# Patient Record
Sex: FEMALE | Race: BLACK OR AFRICAN AMERICAN | NOT HISPANIC OR LATINO
[De-identification: names, ages, dates, MRNs, and addresses within clinical notes are randomized per-mention and may not be internally consistent; named-entity substitution may affect disease eponyms.]

---

## 2022-06-10 ENCOUNTER — APPOINTMENT (OUTPATIENT)
Dept: FAMILY MEDICINE | Facility: CLINIC | Age: 57
End: 2022-06-10
Payer: COMMERCIAL

## 2022-06-10 VITALS
TEMPERATURE: 98 F | SYSTOLIC BLOOD PRESSURE: 130 MMHG | HEIGHT: 62 IN | OXYGEN SATURATION: 98 % | DIASTOLIC BLOOD PRESSURE: 80 MMHG | HEART RATE: 71 BPM | WEIGHT: 156 LBS | BODY MASS INDEX: 28.71 KG/M2

## 2022-06-10 DIAGNOSIS — Z80.0 FAMILY HISTORY OF MALIGNANT NEOPLASM OF DIGESTIVE ORGANS: ICD-10-CM

## 2022-06-10 DIAGNOSIS — M25.569 PAIN IN UNSPECIFIED KNEE: ICD-10-CM

## 2022-06-10 DIAGNOSIS — Z80.3 FAMILY HISTORY OF MALIGNANT NEOPLASM OF BREAST: ICD-10-CM

## 2022-06-10 DIAGNOSIS — M25.579 PAIN IN UNSPECIFIED ANKLE AND JOINTS OF UNSPECIFIED FOOT: ICD-10-CM

## 2022-06-10 DIAGNOSIS — Z11.3 ENCOUNTER FOR SCREENING FOR INFECTIONS WITH A PREDOMINANTLY SEXUAL MODE OF TRANSMISSION: ICD-10-CM

## 2022-06-10 PROCEDURE — 99386 PREV VISIT NEW AGE 40-64: CPT | Mod: 25

## 2022-06-10 PROCEDURE — 36415 COLL VENOUS BLD VENIPUNCTURE: CPT

## 2022-06-10 NOTE — HISTORY OF PRESENT ILLNESS
[FreeTextEntry1] : Ms. RAMY DIAZ presents for annual physical.\par  [de-identified] : Ms. RAMY DIAZ presents for annual physical.  Borderline cholesterol.  \par \par Blacked out in , did not pass out-went to , was advised to see ENT-manuever performed and Cardiology work up was negative.\par \par Following up with Cardio today. Declined EKG today because she has visit with Cardio today. \par Left Knee pain and right ankle pain.  Saw Orthopedist-in 2022.  Had X-rays done, needs to get MRI done.  Did not start PT\par \par Gynecology-2 years ago pap smear normal. \par [id number]-Zplom-needs \par [id number] this year.  Needs repeat. \par \par Family Hx: Mom- during childbirth\par Grandma-Stomach or breast cancer\par Older Sister-breast cancer, stomach cancer\par  \par \par COVID initial series done.  Planning for booster. \par

## 2022-06-10 NOTE — HEALTH RISK ASSESSMENT
[Never] : Never [Yes] : Yes [2 - 4 times a month (2 pts)] : 2-4 times a month (2 points) [# of Members in Household ___] :  household currently consist of [unfilled] member(s) [Employed] : employed [] :  [# Of Children ___] : has [unfilled] children [HIV Test offered] : HIV Test offered [Patient reported mammogram was normal] : Patient reported mammogram was normal [Patient reported PAP Smear was normal] : Patient reported PAP Smear was normal [Patient reported colonoscopy was normal] : Patient reported colonoscopy was normal [de-identified] : Stretching [de-identified] : Varied [MammogramDate] : 2021 [PapSmearDate] : 2020 [ColonoscopyDate] : 2016 [FreeTextEntry2] :  for [facility] [FreeTextEntry3] : Daughter [place], trevon amelia  [de-identified] : Eye Exam-next week; 2020 [de-identified] : 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

## 2022-06-10 NOTE — PLAN
[FreeTextEntry1] : Stable exam.\par Will follow up labwork drawn in office today.\par \par EKG declined-seeing Cardiologist today. \par \par HM-needs repeat Colonoscopy-referral placed.\par Needs to make annual Gyn visit.  Mammo and Sonogram ordered. \par \par Will adjust meds based on labs. \par Patient expressed understanding of plan.\par

## 2022-06-10 NOTE — PHYSICAL EXAM
[Normal Oropharynx] : the oropharynx was normal [Normal TMs] : both tympanic membranes were normal [No Lymphadenopathy] : no lymphadenopathy [No Edema] : there was no peripheral edema [No Extremity Clubbing/Cyanosis] : no extremity clubbing/cyanosis [Normal] : affect was normal and insight and judgment were intact [Normal Sclera/Conjunctiva] : normal sclera/conjunctiva [PERRL] : pupils equal round and reactive to light

## 2022-06-10 NOTE — REVIEW OF SYSTEMS
[Negative] : Genitourinary [Chest Pain] : no chest pain [Palpitations] : no palpitations [Headache] : no headache [Dizziness] : no dizziness [de-identified] : 5-6 hours of sleep per night; more on weekend

## 2022-06-13 LAB
25(OH)D3 SERPL-MCNC: 22.5 NG/ML
ALBUMIN SERPL ELPH-MCNC: 4.7 G/DL
ALP BLD-CCNC: 110 U/L
ALT SERPL-CCNC: 10 U/L
ANION GAP SERPL CALC-SCNC: 11 MMOL/L
AST SERPL-CCNC: 16 U/L
BASOPHILS # BLD AUTO: 0.03 K/UL
BASOPHILS NFR BLD AUTO: 0.9 %
BILIRUB SERPL-MCNC: 0.4 MG/DL
BUN SERPL-MCNC: 18 MG/DL
C TRACH RRNA SPEC QL NAA+PROBE: NOT DETECTED
CALCIUM SERPL-MCNC: 10.1 MG/DL
CHLORIDE SERPL-SCNC: 103 MMOL/L
CHOLEST SERPL-MCNC: 265 MG/DL
CO2 SERPL-SCNC: 25 MMOL/L
CREAT SERPL-MCNC: 1.1 MG/DL
EGFR: 58 ML/MIN/1.73M2
EOSINOPHIL # BLD AUTO: 0.19 K/UL
EOSINOPHIL NFR BLD AUTO: 5.5 %
ESTIMATED AVERAGE GLUCOSE: 120 MG/DL
GLUCOSE SERPL-MCNC: 95 MG/DL
HBA1C MFR BLD HPLC: 5.8 %
HCT VFR BLD CALC: 33 %
HDLC SERPL-MCNC: 105 MG/DL
HGB BLD-MCNC: 11.6 G/DL
HIV1+2 AB SPEC QL IA.RAPID: NONREACTIVE
IMM GRANULOCYTES NFR BLD AUTO: 0 %
LDLC SERPL CALC-MCNC: 148 MG/DL
LYMPHOCYTES # BLD AUTO: 1.32 K/UL
LYMPHOCYTES NFR BLD AUTO: 37.9 %
MAN DIFF?: NORMAL
MCHC RBC-ENTMCNC: 28 PG
MCHC RBC-ENTMCNC: 35.2 GM/DL
MCV RBC AUTO: 79.5 FL
MONOCYTES # BLD AUTO: 0.34 K/UL
MONOCYTES NFR BLD AUTO: 9.8 %
N GONORRHOEA RRNA SPEC QL NAA+PROBE: NOT DETECTED
NEUTROPHILS # BLD AUTO: 1.6 K/UL
NEUTROPHILS NFR BLD AUTO: 45.9 %
NONHDLC SERPL-MCNC: 160 MG/DL
PLATELET # BLD AUTO: 286 K/UL
POTASSIUM SERPL-SCNC: 4.4 MMOL/L
PROT SERPL-MCNC: 7.6 G/DL
RBC # BLD: 4.15 M/UL
RBC # FLD: 13.2 %
SODIUM SERPL-SCNC: 140 MMOL/L
SOURCE AMPLIFICATION: NORMAL
T PALLIDUM AB SER QL IA: NEGATIVE
TRIGL SERPL-MCNC: 57 MG/DL
TSH SERPL-ACNC: 1.53 UIU/ML
WBC # FLD AUTO: 3.48 K/UL

## 2022-06-14 LAB
FERRITIN SERPL-MCNC: 80 NG/ML
FOLATE SERPL-MCNC: 9.3 NG/ML
IRON SATN MFR SERPL: 19 %
IRON SERPL-MCNC: 61 UG/DL
TIBC SERPL-MCNC: 314 UG/DL
TRANSFERRIN SERPL-MCNC: 268 MG/DL
UIBC SERPL-MCNC: 253 UG/DL
VIT B12 SERPL-MCNC: 423 PG/ML

## 2022-09-12 ENCOUNTER — INPATIENT (INPATIENT)
Facility: HOSPITAL | Age: 57
LOS: 1 days | Discharge: HOME | End: 2022-09-14
Attending: INTERNAL MEDICINE | Admitting: INTERNAL MEDICINE

## 2022-09-12 VITALS
HEART RATE: 61 BPM | DIASTOLIC BLOOD PRESSURE: 80 MMHG | OXYGEN SATURATION: 98 % | TEMPERATURE: 98 F | RESPIRATION RATE: 18 BRPM | SYSTOLIC BLOOD PRESSURE: 128 MMHG

## 2022-09-12 DIAGNOSIS — I20.0 UNSTABLE ANGINA: ICD-10-CM

## 2022-09-12 LAB
ALBUMIN SERPL ELPH-MCNC: 3.7 G/DL — SIGNIFICANT CHANGE UP (ref 3.3–5)
ALP SERPL-CCNC: 105 U/L — SIGNIFICANT CHANGE UP (ref 40–120)
ALT FLD-CCNC: 19 U/L — SIGNIFICANT CHANGE UP (ref 12–78)
ANION GAP SERPL CALC-SCNC: 2 MMOL/L — LOW (ref 5–17)
AST SERPL-CCNC: 20 U/L — SIGNIFICANT CHANGE UP (ref 15–37)
BASOPHILS # BLD AUTO: 0.03 K/UL — SIGNIFICANT CHANGE UP (ref 0–0.2)
BASOPHILS NFR BLD AUTO: 0.6 % — SIGNIFICANT CHANGE UP (ref 0–2)
BILIRUB SERPL-MCNC: 0.7 MG/DL — SIGNIFICANT CHANGE UP (ref 0.2–1.2)
BUN SERPL-MCNC: 17 MG/DL — SIGNIFICANT CHANGE UP (ref 7–23)
CALCIUM SERPL-MCNC: 9.3 MG/DL — SIGNIFICANT CHANGE UP (ref 8.5–10.1)
CHLORIDE SERPL-SCNC: 106 MMOL/L — SIGNIFICANT CHANGE UP (ref 96–108)
CK MB BLD-MCNC: <0.6 % — SIGNIFICANT CHANGE UP (ref 0–3.5)
CK MB CFR SERPL CALC: <1 NG/ML — SIGNIFICANT CHANGE UP (ref 0.5–3.6)
CK SERPL-CCNC: 172 U/L — SIGNIFICANT CHANGE UP (ref 26–192)
CO2 SERPL-SCNC: 29 MMOL/L — SIGNIFICANT CHANGE UP (ref 22–31)
CREAT SERPL-MCNC: 1 MG/DL — SIGNIFICANT CHANGE UP (ref 0.5–1.3)
EGFR: 65 ML/MIN/1.73M2 — SIGNIFICANT CHANGE UP
EOSINOPHIL # BLD AUTO: 0.06 K/UL — SIGNIFICANT CHANGE UP (ref 0–0.5)
EOSINOPHIL NFR BLD AUTO: 1.2 % — SIGNIFICANT CHANGE UP (ref 0–6)
FLUAV AG NPH QL: SIGNIFICANT CHANGE UP
FLUBV AG NPH QL: SIGNIFICANT CHANGE UP
GLUCOSE SERPL-MCNC: 92 MG/DL — SIGNIFICANT CHANGE UP (ref 70–99)
HCT VFR BLD CALC: 34 % — LOW (ref 34.5–45)
HGB BLD-MCNC: 11.7 G/DL — SIGNIFICANT CHANGE UP (ref 11.5–15.5)
IMM GRANULOCYTES NFR BLD AUTO: 0.2 % — SIGNIFICANT CHANGE UP (ref 0–1.5)
LYMPHOCYTES # BLD AUTO: 2.05 K/UL — SIGNIFICANT CHANGE UP (ref 1–3.3)
LYMPHOCYTES # BLD AUTO: 42.6 % — SIGNIFICANT CHANGE UP (ref 13–44)
MCHC RBC-ENTMCNC: 27.9 PG — SIGNIFICANT CHANGE UP (ref 27–34)
MCHC RBC-ENTMCNC: 34.4 G/DL — SIGNIFICANT CHANGE UP (ref 32–36)
MCV RBC AUTO: 81.1 FL — SIGNIFICANT CHANGE UP (ref 80–100)
MONOCYTES # BLD AUTO: 0.4 K/UL — SIGNIFICANT CHANGE UP (ref 0–0.9)
MONOCYTES NFR BLD AUTO: 8.3 % — SIGNIFICANT CHANGE UP (ref 2–14)
NEUTROPHILS # BLD AUTO: 2.26 K/UL — SIGNIFICANT CHANGE UP (ref 1.8–7.4)
NEUTROPHILS NFR BLD AUTO: 47.1 % — SIGNIFICANT CHANGE UP (ref 43–77)
NRBC # BLD: 0 /100 WBCS — SIGNIFICANT CHANGE UP (ref 0–0)
PLATELET # BLD AUTO: 286 K/UL — SIGNIFICANT CHANGE UP (ref 150–400)
POTASSIUM SERPL-MCNC: 3.9 MMOL/L — SIGNIFICANT CHANGE UP (ref 3.5–5.3)
POTASSIUM SERPL-SCNC: 3.9 MMOL/L — SIGNIFICANT CHANGE UP (ref 3.5–5.3)
PROT SERPL-MCNC: 7.7 GM/DL — SIGNIFICANT CHANGE UP (ref 6–8.3)
RBC # BLD: 4.19 M/UL — SIGNIFICANT CHANGE UP (ref 3.8–5.2)
RBC # FLD: 13.3 % — SIGNIFICANT CHANGE UP (ref 10.3–14.5)
SARS-COV-2 RNA SPEC QL NAA+PROBE: SIGNIFICANT CHANGE UP
SODIUM SERPL-SCNC: 137 MMOL/L — SIGNIFICANT CHANGE UP (ref 135–145)
TROPONIN I, HIGH SENSITIVITY RESULT: 60.2 NG/L — HIGH
TROPONIN I, HIGH SENSITIVITY RESULT: 64.2 NG/L — HIGH
TSH SERPL-MCNC: 2.39 UU/ML — SIGNIFICANT CHANGE UP (ref 0.36–3.74)
WBC # BLD: 4.81 K/UL — SIGNIFICANT CHANGE UP (ref 3.8–10.5)
WBC # FLD AUTO: 4.81 K/UL — SIGNIFICANT CHANGE UP (ref 3.8–10.5)

## 2022-09-12 PROCEDURE — 99222 1ST HOSP IP/OBS MODERATE 55: CPT

## 2022-09-12 PROCEDURE — 72125 CT NECK SPINE W/O DYE: CPT | Mod: 26,MA

## 2022-09-12 PROCEDURE — 99285 EMERGENCY DEPT VISIT HI MDM: CPT

## 2022-09-12 PROCEDURE — 93010 ELECTROCARDIOGRAM REPORT: CPT

## 2022-09-12 RX ORDER — ASPIRIN/CALCIUM CARB/MAGNESIUM 324 MG
325 TABLET ORAL ONCE
Refills: 0 | Status: COMPLETED | OUTPATIENT
Start: 2022-09-12 | End: 2022-09-12

## 2022-09-12 RX ADMIN — Medication 325 MILLIGRAM(S): at 17:26

## 2022-09-12 NOTE — ED ADULT NURSE NOTE - NSIMPLEMENTINTERV_GEN_ALL_ED
Implemented All Universal Safety Interventions:  [place] to call system. Call bell, personal items and telephone within reach. Instruct patient to call for assistance. Room bathroom lighting operational. Non-slip footwear when patient is off stretcher. Physically safe environment: no spills, clutter or unnecessary equipment. Stretcher in lowest position, wheels locked, appropriate side rails in place.

## 2022-09-12 NOTE — ED PROVIDER NOTE - MUSCULOSKELETAL, MLM
Spine appears normal, range of motion is not limited, left upper back ttp, normal strength and sensation no midline ttp.

## 2022-09-12 NOTE — ED PROVIDER NOTE - OBJECTIVE STATEMENT
Pt is a 57 year old female with no significant PMH who presents to the ED today for left shoulder pain x 2-3 months. Pain worsened last night and started to radiate down her left arm. Rates pain 2/10 currently. Pt called a cardiology office and was told to present to the ED. Denies CP, pinched nerves, neck pain. Admits to occasional ETOH use, but denies illicit substance or tobacco use. Pt is a 57 year old female with no significant PMH who presents to the ED today for left shoulder pain x 2-3 months. Pain worsened last night, pt states that sh4e was at rest watching TV when her symptoms started again, she describes her pain as heaviness and tingling sensation  radiating down her left arm. Rates pain 2/10 currently. Pt called a cardiology office and was told to present to the ED. Denies CP, pinched nerves, neck pain. Admits to occasional ETOH use, but denies illicit substance or tobacco use.

## 2022-09-12 NOTE — ED ADULT NURSE NOTE - OBJECTIVE STATEMENT
pt is 56 y/o female c/c of left shoulder pain onset last night. Pt denies numbness and tingling on L upper extremity. pt denies any injury or trauma to site. pt took 1 Excedrin this morning. pt denies chest pain, SOB.

## 2022-09-12 NOTE — H&P ADULT - PROBLEM SELECTOR PLAN 1
aspirin given  Troponin elevated.  Will trend  No current chest pain.  Cardio eval in am.  TTE in am

## 2022-09-12 NOTE — H&P ADULT - HISTORY OF PRESENT ILLNESS
Patient is a 57F with a no reported PMH who presents to the ED for L arm pain.  Patient states that last night she got tingling, pinching pains to her L shoulder while she was laying in bed.  Severity 7/10 with radiation down to the L hand.  Patient states that she has been getting nonexertional L shoulder pain over the last two months but states that no episode was as severe as last night.  Patient denies history of CP or L shoulder pain with exertion.  States that she started following with a cardiologist for this shoulder pain a few months ago and had a negative stress test done in July.  Patient states that she also had mild plaques on a carotid US but otherwise has no medical history.  Social drinker, denies tobacco or recreational drug use.  No daily meds, NKDA.  Vitals stable, labs reveal mildly elevated troponin level.  Will admit to tele

## 2022-09-12 NOTE — ED ADULT TRIAGE NOTE - CHIEF COMPLAINT QUOTE
pt c/o left shoulder pain since last night. pt states she had the pain for 2 month, last night was worst. no hx. no injury.

## 2022-09-12 NOTE — H&P ADULT - NSHPREVIEWOFSYSTEMS_GEN_ALL_CORE
Constitutional: no fever, chills, night sweats  Ears: no hearing changes or ear pain,   Nose: no nasal congestion, sinus pain, or rhinorrhea  Cardio: L arm pain positive.  No orthopnea, edema, or palpitations  Resp: no dyspnea, cough, wheezing  GI: no nausea, vomiting, diarrhea, constipation, hematochezia, or melena  : no dysuria, urinary frequency, hematuria  MSK: no back pain, neck pain  Skin: no rash, pruritis   Neuro: no weakness, dizziness, lightheadedness, syncope   Heme/Lymph: no bruising or bleeding

## 2022-09-12 NOTE — H&P ADULT - NSHPPHYSICALEXAM_GEN_ALL_CORE
Physical exam:  General: patient in no acute distress, resting comfortably  Head:  Atraumatic, Normocephalic  Eyes: EOMI, PERRLA, clear sclera  Neck: Supple, thyroid nontender, non enlarged  Cardio: S1/S2 +ve, regular rate and rhythm, no M/G/R  Resp: clear to ausculation bilaterally, no rales or wheezes  GI: abdomen soft, nontender, non distended, no guarding, BS +ve x 4  Ext: no significant pedal edema, Full AROM of L shoulder  Neuro: CN 2-12 intact, no significant motor or sensory deficits.  Skin: No rashes or lesions

## 2022-09-13 ENCOUNTER — TRANSCRIPTION ENCOUNTER (OUTPATIENT)
Age: 57
End: 2022-09-13

## 2022-09-13 LAB
A1C WITH ESTIMATED AVERAGE GLUCOSE RESULT: 5.9 % — HIGH (ref 4–5.6)
CHOLEST SERPL-MCNC: 248 MG/DL — HIGH
ESTIMATED AVERAGE GLUCOSE: 123 MG/DL — HIGH (ref 68–114)
HCV AB S/CO SERPL IA: 0.14 S/CO — SIGNIFICANT CHANGE UP (ref 0–0.99)
HCV AB SERPL-IMP: SIGNIFICANT CHANGE UP
HDLC SERPL-MCNC: 96 MG/DL — SIGNIFICANT CHANGE UP
LIPID PNL WITH DIRECT LDL SERPL: 137 MG/DL — HIGH
NON HDL CHOLESTEROL: 152 MG/DL — HIGH
TRIGL SERPL-MCNC: 77 MG/DL — SIGNIFICANT CHANGE UP
TROPONIN I, HIGH SENSITIVITY RESULT: 63 NG/L — HIGH

## 2022-09-13 PROCEDURE — 99232 SBSQ HOSP IP/OBS MODERATE 35: CPT

## 2022-09-13 PROCEDURE — 99223 1ST HOSP IP/OBS HIGH 75: CPT

## 2022-09-13 PROCEDURE — 93306 TTE W/DOPPLER COMPLETE: CPT | Mod: 26

## 2022-09-13 RX ORDER — INFLUENZA VIRUS VACCINE 15; 15; 15; 15 UG/.5ML; UG/.5ML; UG/.5ML; UG/.5ML
0.5 SUSPENSION INTRAMUSCULAR ONCE
Refills: 0 | Status: COMPLETED | OUTPATIENT
Start: 2022-09-13 | End: 2022-09-13

## 2022-09-13 NOTE — CONSULT NOTE ADULT - ASSESSMENT
57F with no pmhx p/w L arm pain.     LUE pain  -pt denies cp or sob at rest or on exertion  -EKG with TWI v1-v3 concerning for ischemia  -pt is comfortable appearing euvolemic, in no distress  -CE not consistent with ACS  -would not treat for acs at this time  -called outpt cardiologist Dr Kishan Paige, [facility], pt last seen 6/2022, had TTE at that time (no stress testing), LVEF 63%, no structural or valvular lesions pt also ahd a stress echo in 2020 where she exercised for 12 min and there was no ischemia.   -cont tele  -symptoms unchanged over the past few weeks to months  -would have pt under go an ischemic eval with a stress test  -given pt lack of new symptoms, HD stability and no cp, and CE not consistent with acs, will d/w pt regarding in pt vs outpt testing  -cont w/u of other causes of L arm pain per primary team  -will follow with you

## 2022-09-13 NOTE — PATIENT PROFILE ADULT - FALL HARM RISK - HARM RISK INTERVENTIONS

## 2022-09-13 NOTE — DISCHARGE NOTE PROVIDER - HOSPITAL COURSE
57F with no pmhx p/w L arm pain.     LUE pain  -pt denies cp or sob at rest or on exertion  -EKG with TWI v1-v3 concerning for ischemia  -pt is comfortable appearing euvolemic, in no distress  -CE not consistent with ACS  -would not treat for acs at this time  -cardiologist Dr Kishan Paige, [facility], was contacted by service cardio:  pt last seen 6/2022, had TTE at that time (no stress testing), LVEF 63%, no structural or valvular lesions pt also ahd a stress echo in 2020 where she exercised for 12 min and there was no ischemia.   -symptoms unchanged over the past few weeks to months  -given pt lack of new symptoms, HD stability and no cp, and CE not consistent with acs, will d/w pt regarding in pt vs outpt testing       57F with no pmhx p/w L arm pain.     LUE pain  -pt denies cp or sob at rest or on exertion  -EKG with TWI v1-v3 concerning for ischemia  -pt is comfortable appearing euvolemic, in no distress  -CE not consistent with ACS  -would not treat for acs at this time  -cardiologist Dr Kishan Paige, [facility], was contacted by service cardio:  pt last seen 6/2022, had TTE at that time (no stress testing), LVEF 63%, no structural or valvular lesions pt also ahd a stress echo in 2020 where she exercised for 12 min and there was no ischemia.   -symptoms unchanged over the past few weeks to months  -Stress test neg    HLD:  , diet control and exercise    Pre DM:  Hba 1c 5.9

## 2022-09-13 NOTE — PROGRESS NOTE ADULT - NSPROGADDITIONALINFOA_GEN_ALL_CORE
exam findings point to msk findings  EKG Chronic same  gave patient option to stay  otherwise dc complete

## 2022-09-13 NOTE — DISCHARGE NOTE PROVIDER - CARE PROVIDER_API CALL
[facility],   Phone: (   )    -  Fax: (   )    -  Follow Up Time:     Dave Garner)  Internal Medicine  [address] [place] [address]  Phone: [phone]  Fax: ()-  Follow Up Time:

## 2022-09-13 NOTE — DISCHARGE NOTE NURSING/CASE MANAGEMENT/SOCIAL WORK - PATIENT PORTAL LINK FT
You can access the FollowMyHealth Patient Portal offered by [facility] by registering at the following website: [email or web address]. By joining [facility]’s FollowMyHealth portal, you will also be able to view your health information using other applications (apps) compatible with our system.

## 2022-09-13 NOTE — DISCHARGE NOTE NURSING/CASE MANAGEMENT/SOCIAL WORK - NSDCPEFALRISK_GEN_ALL_CORE
For information on Fall & Injury Prevention, visit: [email or web address] OR  [email or web address] OR  https://www.cdc.gov/steadi/patient.html

## 2022-09-13 NOTE — CONSULT NOTE ADULT - SUBJECTIVE AND OBJECTIVE BOX
MARVIN IBARRA  [id number]    Date of Consult:  9/13/22  CHIEF COMPLAINT:  L arm pain  HISTORY OF PRESENT ILLNESS:  57F with no pmhx p/w L arm pain. pt states she called her outpt cardiologist (Dr Kishan Paige, [facility]) and was told to come to the ER bc he had no available appointments pt states several weeks to months of LUE pain and paresthesias, denies cp or sob at rest or on exertion. pt states she hada stress test a few months ago but did not hear that it was abnormal. pt is comfortable appearing here. EKG with TWI in v1-v3, trop 60s x3, CKMB <1.   pt denies hx of palpitations, dizziness, diaphoresis. pt laying flat, not on oxygen.    Allergies    No Known Allergies    Intolerances    	    MEDICATIONS:              influenza   Vaccine 0.5 milliLiter(s) IntraMuscular once      PAST MEDICAL & SURGICAL HISTORY:  No pertinent past medical history      No significant past surgical history          FAMILY HISTORY:  FH: HTN (hypertension)        SOCIAL HISTORY:    no tob, +social etoh, no drugs      REVIEW OF SYSTEMS:  See HPI. Otherwise, 10 point ROS done and otherwise negative.    PHYSICAL EXAM:  T(C): 36.7 (09-13-22 @ 11:16), Max: 36.9 (09-12-22 @ 11:56)  HR: 55 (09-13-22 @ 11:16) (51 - 85)  BP: 140/89 (09-13-22 @ 11:16) (128/80 - 167/72)  RR: 16 (09-13-22 @ 11:16) (16 - 20)  SpO2: 100% (09-13-22 @ 11:16) (98% - 100%)  Wt(kg): --  I&O's Summary      Appearance: Normal	  HEENT:   Normal oral mucosa, PERRL, EOMI	  Lymphatic: No lymphadenopathy  Cardiovascular: Normal S1 S2, No JVD, No murmurs, No edema  Respiratory: Lungs grossly clear to auscultation	  Psychiatry: A & O x 3, Mood & affect appropriate  Gastrointestinal:  Soft, Non-tender, + BS	  Skin: No rashes, No ecchymoses, No cyanosis	  Neurologic: Non-focal  Extremities: Normal range of motion, No clubbing, cyanosis       LABS:	 	    CBC Full  -  ( 12 Sep 2022 14:20 )  WBC Count : 4.81 K/uL  Hemoglobin : 11.7 g/dL  Hematocrit : 34.0 %  Platelet Count - Automated : 286 K/uL  Mean Cell Volume : 81.1 fl  Mean Cell Hemoglobin : 27.9 pg  Mean Cell Hemoglobin Concentration : 34.4 g/dL  Auto Neutrophil # : 2.26 K/uL  Auto Lymphocyte # : 2.05 K/uL  Auto Monocyte # : 0.40 K/uL  Auto Eosinophil # : 0.06 K/uL  Auto Basophil # : 0.03 K/uL  Auto Neutrophil % : 47.1 %  Auto Lymphocyte % : 42.6 %  Auto Monocyte % : 8.3 %  Auto Eosinophil % : 1.2 %  Auto Basophil % : 0.6 %    09-12    137  |  106  |  17  ----------------------------<  92  3.9   |  29  |  1.00    Ca    9.3      12 Sep 2022 14:20    TPro  7.7  /  Alb  3.7  /  TBili  0.7  /  DBili  x   /  AST  20  /  ALT  19  /  AlkPhos  105  09-12      proBNP:   Lipid Profile:   HgA1c:   TSH: Thyroid Stimulating Hormone, Serum: 2.390 uU/mL (09-12 @ 22:55)        CARDIAC MARKERS:            TELEMETRY: 	    ECG:  	  RADIOLOGY:  OTHER: 	    PREVIOUS DIAGNOSTIC TESTING:    [ ] Echocardiogram:  [ ]  Catheterization:  [ ] Stress Test:  	  	  ASSESSMENT/PLAN: 	    Dave Garner MD

## 2022-09-13 NOTE — DISCHARGE NOTE PROVIDER - NSDCCPCAREPLAN_GEN_ALL_CORE_FT
PRINCIPAL DISCHARGE DIAGNOSIS  Diagnosis: Right shoulder pain  Assessment and Plan of Treatment: Based on exam findings and in review of your previous tests via your outpatiet cardiologist, we suspect this is minor muscle strain  we suggest analagsics as needed and outpatient occupational therapy       PRINCIPAL DISCHARGE DIAGNOSIS  Diagnosis: Right shoulder pain  Assessment and Plan of Treatment: Based on exam findings and in review of your previous tests via your outpatiet cardiologist, we suspect this is minor muscle strain  we suggest analagsics as needed and outpatient occupational therapy      SECONDARY DISCHARGE DIAGNOSES  Diagnosis: Chest pain  Assessment and Plan of Treatment: 57F with no pmhx p/w L arm pain.   LUE pain  -pt denies cp or sob at rest or on exertion  -EKG with TWI v1-v3 concerning for ischemia  -pt is comfortable appearing euvolemic, in no distress  -CE not consistent with ACS  -would not treat for acs at this time  -cardiologist Dr Kishan Paige, [facility], was contacted by service cardio:  pt last seen 6/2022, had TTE at that time (no stress testing), LVEF 63%, no structural or valvular lesions pt also ahd a stress echo in 2020 where she exercised for 12 min and there was no ischemia.   -symptoms unchanged over the past few weeks to months  -Stress test neg  HLD:  , diet control and exercise  Pre DM:  Hba 1c 5.9

## 2022-09-14 VITALS
OXYGEN SATURATION: 98 % | RESPIRATION RATE: 18 BRPM | HEART RATE: 51 BPM | SYSTOLIC BLOOD PRESSURE: 145 MMHG | TEMPERATURE: 98 F | DIASTOLIC BLOOD PRESSURE: 71 MMHG

## 2022-09-14 LAB
ANION GAP SERPL CALC-SCNC: 7 MMOL/L — SIGNIFICANT CHANGE UP (ref 5–17)
BUN SERPL-MCNC: 22 MG/DL — SIGNIFICANT CHANGE UP (ref 7–23)
CALCIUM SERPL-MCNC: 9.1 MG/DL — SIGNIFICANT CHANGE UP (ref 8.5–10.1)
CHLORIDE SERPL-SCNC: 110 MMOL/L — HIGH (ref 96–108)
CO2 SERPL-SCNC: 25 MMOL/L — SIGNIFICANT CHANGE UP (ref 22–31)
CREAT SERPL-MCNC: 0.96 MG/DL — SIGNIFICANT CHANGE UP (ref 0.5–1.3)
EGFR: 69 ML/MIN/1.73M2 — SIGNIFICANT CHANGE UP
GLUCOSE SERPL-MCNC: 105 MG/DL — HIGH (ref 70–99)
HCT VFR BLD CALC: 34.3 % — LOW (ref 34.5–45)
HGB BLD-MCNC: 11.9 G/DL — SIGNIFICANT CHANGE UP (ref 11.5–15.5)
MCHC RBC-ENTMCNC: 28.1 PG — SIGNIFICANT CHANGE UP (ref 27–34)
MCHC RBC-ENTMCNC: 34.7 G/DL — SIGNIFICANT CHANGE UP (ref 32–36)
MCV RBC AUTO: 80.9 FL — SIGNIFICANT CHANGE UP (ref 80–100)
NRBC # BLD: 0 /100 WBCS — SIGNIFICANT CHANGE UP (ref 0–0)
PLATELET # BLD AUTO: 262 K/UL — SIGNIFICANT CHANGE UP (ref 150–400)
POTASSIUM SERPL-MCNC: 3.8 MMOL/L — SIGNIFICANT CHANGE UP (ref 3.5–5.3)
POTASSIUM SERPL-SCNC: 3.8 MMOL/L — SIGNIFICANT CHANGE UP (ref 3.5–5.3)
RBC # BLD: 4.24 M/UL — SIGNIFICANT CHANGE UP (ref 3.8–5.2)
RBC # FLD: 13.3 % — SIGNIFICANT CHANGE UP (ref 10.3–14.5)
SODIUM SERPL-SCNC: 142 MMOL/L — SIGNIFICANT CHANGE UP (ref 135–145)
WBC # BLD: 4.36 K/UL — SIGNIFICANT CHANGE UP (ref 3.8–10.5)
WBC # FLD AUTO: 4.36 K/UL — SIGNIFICANT CHANGE UP (ref 3.8–10.5)

## 2022-09-14 PROCEDURE — 99239 HOSP IP/OBS DSCHRG MGMT >30: CPT

## 2022-09-14 PROCEDURE — 99233 SBSQ HOSP IP/OBS HIGH 50: CPT

## 2022-09-14 PROCEDURE — 78452 HT MUSCLE IMAGE SPECT MULT: CPT | Mod: 26

## 2022-09-14 NOTE — PROGRESS NOTE ADULT - SUBJECTIVE AND OBJECTIVE BOX
24H hour events:   pt resting  no complaints  no acute events overnight  MEDICATIONS:              influenza   Vaccine 0.5 milliLiter(s) IntraMuscular once          PHYSICAL EXAM:  T(C): 36.4 (09-14-22 @ 04:18), Max: 36.9 (09-13-22 @ 15:58)  HR: 56 (09-14-22 @ 04:18) (56 - 63)  BP: 133/83 (09-14-22 @ 04:18) (108/72 - 133/83)  RR: 17 (09-14-22 @ 04:18) (17 - 18)  SpO2: 98% (09-14-22 @ 04:18) (97% - 98%)  Wt(kg): --  I&O's Summary    13 Sep 2022 07:01  -  14 Sep 2022 07:00  --------------------------------------------------------  IN: 480 mL / OUT: 0 mL / NET: 480 mL        Appearance: Normal	  HEENT:   Normal oral mucosa, PERRL, EOMI	  Lymphatic: No lymphadenopathy  Cardiovascular: Normal S1 S2, No JVD, No murmurs, No edema  Respiratory: Lungs clear to auscultation	  Psychiatry: A & O x 3, Mood & affect appropriate  Gastrointestinal:  Soft, Non-tender, + BS	  Skin: No rashes, No ecchymoses, No cyanosis	  Neurologic: Non-focal  Extremities: Normal range of motion, No clubbing, cyanosis      LABS:	 	    CBC Full  -  ( 14 Sep 2022 06:50 )  WBC Count : 4.36 K/uL  Hemoglobin : 11.9 g/dL  Hematocrit : 34.3 %  Platelet Count - Automated : 262 K/uL  Mean Cell Volume : 80.9 fl  Mean Cell Hemoglobin : 28.1 pg  Mean Cell Hemoglobin Concentration : 34.7 g/dL  Auto Neutrophil # : x  Auto Lymphocyte # : x  Auto Monocyte # : x  Auto Eosinophil # : x  Auto Basophil # : x  Auto Neutrophil % : x  Auto Lymphocyte % : x  Auto Monocyte % : x  Auto Eosinophil % : x  Auto Basophil % : x    09-14    142  |  110<H>  |  22  ----------------------------<  105<H>  3.8   |  25  |  0.96    Ca    9.1      14 Sep 2022 06:50        proBNP:   Lipid Profile:   HgA1c:   TSH:       CARDIAC MARKERS:            TELEMETRY: 	    ECG:  	  RADIOLOGY:  OTHER: 	    PREVIOUS DIAGNOSTIC TESTING:    [ ] Echocardiogram:< from: TTE Echo Complete w/o Contrast w/ Doppler (09.13.22 @ 10:13) >  PHYSICIAN INTERPRETATION:  Left Ventricle: Increased relative wall thickness with normal mass index   consistent with left ventricular concentric remodeling.  Global LV systolic function was normal. Left ventricular ejection   fraction, by visual estimation, is 55 to 60%. Spectral Doppler shows   normal pattern of LV diastolic filling.  Right Ventricle: RV systolic function is normal.  Left Atrium: The left atrium is normal in size.  Right Atrium: The right atrium was not well visualized.  Pericardium: There is no evidence of pericardial effusion.  Mitral Valve: Structurally normal mitral valve, with normal leaflet   excursion.  Tricuspid Valve:Structurally normal tricuspid valve, with normal leaflet   excursion. Mild tricuspid regurgitation is visualized.  Aortic Valve: Normal trileaflet aortic valve with normal opening. No   evidence of aortic valve regurgitation is seen.  Pulmonic Valve:Structurally normal pulmonic valve, with normal leaflet   excursion. No indication of pulmonic valve regurgitation.  Aorta: The aortic root is normal in size and structure.  Venous: The inferior vena cava was normal sized, with respiratory size   variation greater than 50%.      < end of copied text >    [ ]  Catheterization:  [ ] Stress Test:  	< from: NM Stress Test, Dual Isotope (09.14.22 @ 12:00) >  IMPRESSION: Normal myocardial perfusion scan.    1. No scintigraphic evidence for myocardial infarct or ischemia.    2. There is normal left ventricular contractility, normal calculated   ejection fraction and normal myocardial thickening at rest. Overall post   stress ejectionfraction was 79%    < end of copied text >    	  ASSESSMENT/PLAN: 	    
patient seen and examined  no events  walking ambulatorty  denies chest pain shortness of breath  shoulder pain resolved  EKG noted with inversions: Stable chronic changes since June outpatient visit  gave patient option to stay for stress or go home  Review of Systems:  General:denies fever chills, headache, weakness  HEENT: denies blurry vision,diffculty swallowing, difficulty hearing, tinnitus  Cardiovascular: denies chest pain  ,palpitations  Pulmonary:denies shortness of breath, cough, wheezing, hemoptysis  Gastrointestinal: denies abdominal pain, constipation, diarrhea,nausea , vomiting, hematochezia  : denies hematuria, dysuria, or incontinence  Neurological: denies weakness, numbness , tingling, dizziness, tremors  MSK: denies muscle pain, difficulty ambulating, swelling, back pain  skin: denies skin rash, itching, burning, or  skin lesions  Psychiatrical: denies mood disturbances, anxierty, feeling depressed, depression , or difficulty sleeping    Objective:  Vitals  T(C): 36.4 (09-14-22 @ 04:18), Max: 36.9 (09-13-22 @ 15:58)  HR: 56 (09-14-22 @ 04:18) (55 - 63)  BP: 133/83 (09-14-22 @ 04:18) (108/72 - 140/89)  RR: 17 (09-14-22 @ 04:18) (16 - 18)  SpO2: 98% (09-14-22 @ 04:18) (97% - 100%)    Physical Exam:  General: comfortable, no acute distress, well nourished  HEENT: Atraumatic, no LAD, trachea midline, PERRLA  Cardiovascular: normal s1s2, no murmurs, gallops or fricition rubs  Pulmonary: clear to ausculation Bilaterally, no wheezing , rhonchi  Gastrointestinal: soft non tender non distended, no masses felt, no organomegally  Muscloskeletal: no lower extremity edema, intact bilateral lower extremity pulses  Neurological: CN II-12 intact. No focal weakness  Psychiatrical: normal mood, cooperative  SKIN: no rash, lesions or ulcers    Labs:                          11.9   4.36  )-----------( 262      ( 14 Sep 2022 06:50 )             34.3     09-14    142  |  110<H>  |  22  ----------------------------<  105<H>  3.8   |  25  |  0.96    Ca    9.1      14 Sep 2022 06:50    TPro  7.7  /  Alb  3.7  /  TBili  0.7  /  DBili  x   /  AST  20  /  ALT  19  /  AlkPhos  105  09-12    LIVER FUNCTIONS - ( 12 Sep 2022 14:20 )  Alb: 3.7 g/dL / Pro: 7.7 gm/dL / ALK PHOS: 105 U/L / ALT: 19 U/L / AST: 20 U/L / GGT: x                 Active Medications  MEDICATIONS  (STANDING):  influenza   Vaccine 0.5 milliLiter(s) IntraMuscular once    MEDICATIONS  (PRN):

## 2022-09-14 NOTE — PROGRESS NOTE ADULT - ASSESSMENT
57F with no pmhx p/w L arm pain.     LUE pain  -pt denies cp or sob at rest or on exertion  -CE not consistent with ACS  -tte and nuc stress completed, both grossly unremarkable  -no plans for further in pt cardiac w/u  -pt to follow up with her cardiologist upon dc/  -would consider alternative etiologies to LUE symptoms including ortho and neuro  
Patient is a 57F with a no reported PMH who presents to the ED for L arm pain.  Patient states that last night she got tingling, pinching pains to her L shoulder while she was laying in bed.  Severity 7/10 with radiation down to the L hand.  Patient states that she has been getting nonexertional L shoulder pain over the last two months but states that no episode was as severe as last night.  Patient denies history of CP or L shoulder pain with exertion.  States that she started following with a cardiologist for this shoulder pain a few months ago and had a negative stress test done in July.  Patient states that she also had mild plaques on a carotid US but otherwise has no medical history.  Social drinker, denies tobacco or recreational drug use.  No daily meds, NKDA.  Vitals stable, labs reveal mildly elevated troponin level.  Will admit to tele

## 2022-09-19 ENCOUNTER — NON-APPOINTMENT (OUTPATIENT)
Age: 57
End: 2022-09-19

## 2022-09-19 DIAGNOSIS — E78.5 HYPERLIPIDEMIA, UNSPECIFIED: ICD-10-CM

## 2022-09-19 DIAGNOSIS — R77.8 OTHER SPECIFIED ABNORMALITIES OF PLASMA PROTEINS: ICD-10-CM

## 2022-09-19 DIAGNOSIS — M25.512 PAIN IN LEFT SHOULDER: ICD-10-CM

## 2022-09-19 DIAGNOSIS — R73.03 PREDIABETES: ICD-10-CM

## 2022-09-19 DIAGNOSIS — R07.9 CHEST PAIN, UNSPECIFIED: ICD-10-CM

## 2023-02-13 PROBLEM — Z78.9 OTHER SPECIFIED HEALTH STATUS: Chronic | Status: ACTIVE | Noted: 2022-09-12

## 2023-03-08 ENCOUNTER — APPOINTMENT (OUTPATIENT)
Dept: FAMILY MEDICINE | Facility: CLINIC | Age: 58
End: 2023-03-08
Payer: COMMERCIAL

## 2023-03-08 VITALS
DIASTOLIC BLOOD PRESSURE: 80 MMHG | HEIGHT: 62 IN | TEMPERATURE: 98 F | OXYGEN SATURATION: 98 % | BODY MASS INDEX: 27.23 KG/M2 | WEIGHT: 148 LBS | SYSTOLIC BLOOD PRESSURE: 118 MMHG | HEART RATE: 68 BPM

## 2023-03-08 DIAGNOSIS — D64.9 ANEMIA, UNSPECIFIED: ICD-10-CM

## 2023-03-08 DIAGNOSIS — Z00.00 ENCOUNTER FOR GENERAL ADULT MEDICAL EXAMINATION W/OUT ABNORMAL FINDINGS: ICD-10-CM

## 2023-03-08 DIAGNOSIS — R79.89 OTHER SPECIFIED ABNORMAL FINDINGS OF BLOOD CHEMISTRY: ICD-10-CM

## 2023-03-08 PROCEDURE — 36415 COLL VENOUS BLD VENIPUNCTURE: CPT

## 2023-03-08 PROCEDURE — 99214 OFFICE O/P EST MOD 30 MIN: CPT | Mod: 25

## 2023-03-08 NOTE — HISTORY OF PRESENT ILLNESS
[FreeTextEntry1] : Here for follow-up; needs med refills.\par  [de-identified] : Here for follow-up; needs med refills.\par Went to ER in the fall- had left shoulder pain-negative cardio work up, was Dced. \par \par Had COVID in December 2022 \par \par No recent shoulder pain.\par \par Needs to make preventive medicine appointments. \par Not taking any medications currently.  \par

## 2023-03-08 NOTE — PHYSICAL EXAM
[No Acute Distress] : no acute distress [Well Developed] : well developed [Normal Oropharynx] : the oropharynx was normal [No Lymphadenopathy] : no lymphadenopathy [No Edema] : there was no peripheral edema [No Extremity Clubbing/Cyanosis] : no extremity clubbing/cyanosis [Normal] : affect was normal and insight and judgment were intact

## 2023-03-08 NOTE — PLAN
[FreeTextEntry1] : Will follow up labwork drawn in office today.\par Not fasting. \par \par Has not been taking Vit D.  Recheck levels.\par \par Hx of anemia-check labs.\par \par Needs cardio follow-up.\par Will adjust meds based on labs. \par Patient expressed understanding of plan.\par

## 2023-03-08 NOTE — REVIEW OF SYSTEMS
[Negative] : Gastrointestinal [Fever] : no fever [Chills] : no chills [Nasal Discharge] : no nasal discharge [Chest Pain] : no chest pain [Palpitations] : no palpitations [Shortness Of Breath] : no shortness of breath [Wheezing] : no wheezing [Cough] : no cough [Dysuria] : no dysuria [Headache] : no headache [Dizziness] : no dizziness

## 2023-03-09 ENCOUNTER — NON-APPOINTMENT (OUTPATIENT)
Age: 58
End: 2023-03-09

## 2023-03-09 LAB
25(OH)D3 SERPL-MCNC: 20.6 NG/ML
ALBUMIN SERPL ELPH-MCNC: 4.4 G/DL
ALP BLD-CCNC: 101 U/L
ALT SERPL-CCNC: 12 U/L
ANION GAP SERPL CALC-SCNC: 13 MMOL/L
AST SERPL-CCNC: 18 U/L
BASOPHILS # BLD AUTO: 0.03 K/UL
BASOPHILS NFR BLD AUTO: 0.6 %
BILIRUB SERPL-MCNC: 0.4 MG/DL
BUN SERPL-MCNC: 16 MG/DL
CALCIUM SERPL-MCNC: 9.6 MG/DL
CHLORIDE SERPL-SCNC: 104 MMOL/L
CHOLEST SERPL-MCNC: 237 MG/DL
CO2 SERPL-SCNC: 24 MMOL/L
CREAT SERPL-MCNC: 0.95 MG/DL
EGFR: 69 ML/MIN/1.73M2
EOSINOPHIL # BLD AUTO: 0.22 K/UL
EOSINOPHIL NFR BLD AUTO: 4.5 %
ESTIMATED AVERAGE GLUCOSE: 120 MG/DL
FERRITIN SERPL-MCNC: 81 NG/ML
GLUCOSE SERPL-MCNC: 93 MG/DL
HBA1C MFR BLD HPLC: 5.8 %
HCT VFR BLD CALC: 32.8 %
HDLC SERPL-MCNC: 101 MG/DL
HGB BLD-MCNC: 11.5 G/DL
IMM GRANULOCYTES NFR BLD AUTO: 0.2 %
IRON SATN MFR SERPL: 14 %
IRON SERPL-MCNC: 46 UG/DL
LDLC SERPL CALC-MCNC: 121 MG/DL
LYMPHOCYTES # BLD AUTO: 1.76 K/UL
LYMPHOCYTES NFR BLD AUTO: 36 %
MAN DIFF?: NORMAL
MCHC RBC-ENTMCNC: 28.3 PG
MCHC RBC-ENTMCNC: 35.1 GM/DL
MCV RBC AUTO: 80.6 FL
MONOCYTES # BLD AUTO: 0.36 K/UL
MONOCYTES NFR BLD AUTO: 7.4 %
NEUTROPHILS # BLD AUTO: 2.51 K/UL
NEUTROPHILS NFR BLD AUTO: 51.3 %
NONHDLC SERPL-MCNC: 137 MG/DL
PLATELET # BLD AUTO: 295 K/UL
POTASSIUM SERPL-SCNC: 4 MMOL/L
PROT SERPL-MCNC: 7.3 G/DL
RBC # BLD: 4.07 M/UL
RBC # FLD: 14 %
SODIUM SERPL-SCNC: 141 MMOL/L
TIBC SERPL-MCNC: 330 UG/DL
TRANSFERRIN SERPL-MCNC: 273 MG/DL
TRIGL SERPL-MCNC: 80 MG/DL
TSH SERPL-ACNC: 1.81 UIU/ML
UIBC SERPL-MCNC: 284 UG/DL
WBC # FLD AUTO: 4.89 K/UL

## 2023-04-08 ENCOUNTER — RESULT REVIEW (OUTPATIENT)
Age: 58
End: 2023-04-08

## 2023-04-08 ENCOUNTER — OUTPATIENT (OUTPATIENT)
Dept: OUTPATIENT SERVICES | Facility: HOSPITAL | Age: 58
LOS: 1 days | End: 2023-04-08
Payer: COMMERCIAL

## 2023-04-08 ENCOUNTER — APPOINTMENT (OUTPATIENT)
Dept: ULTRASOUND IMAGING | Facility: IMAGING CENTER | Age: 58
End: 2023-04-08
Payer: COMMERCIAL

## 2023-04-08 ENCOUNTER — APPOINTMENT (OUTPATIENT)
Dept: MAMMOGRAPHY | Facility: IMAGING CENTER | Age: 58
End: 2023-04-08
Payer: COMMERCIAL

## 2023-04-08 DIAGNOSIS — Z00.8 ENCOUNTER FOR OTHER GENERAL EXAMINATION: ICD-10-CM

## 2023-04-08 PROCEDURE — 76641 ULTRASOUND BREAST COMPLETE: CPT | Mod: 26,50

## 2023-04-08 PROCEDURE — 77063 BREAST TOMOSYNTHESIS BI: CPT

## 2023-04-08 PROCEDURE — 76641 ULTRASOUND BREAST COMPLETE: CPT

## 2023-04-08 PROCEDURE — 77067 SCR MAMMO BI INCL CAD: CPT | Mod: 26

## 2023-04-08 PROCEDURE — 77063 BREAST TOMOSYNTHESIS BI: CPT | Mod: 26

## 2023-04-08 PROCEDURE — 77067 SCR MAMMO BI INCL CAD: CPT

## 2023-05-19 ENCOUNTER — APPOINTMENT (OUTPATIENT)
Dept: MAMMOGRAPHY | Facility: CLINIC | Age: 58
End: 2023-05-19

## 2023-05-19 ENCOUNTER — APPOINTMENT (OUTPATIENT)
Dept: ULTRASOUND IMAGING | Facility: CLINIC | Age: 58
End: 2023-05-19

## 2024-08-20 NOTE — PATIENT PROFILE ADULT - FUNCTIONAL ASSESSMENT - BASIC MOBILITY ASSESSMENT TYPE
Benefits, risks, and possible complications of procedure explained to patient/caregiver who verbalized understanding and gave verbal consent. Admission

## 2024-10-15 ENCOUNTER — EMERGENCY (EMERGENCY)
Facility: HOSPITAL | Age: 59
LOS: 1 days | Discharge: HOME | End: 2024-10-15
Attending: STUDENT IN AN ORGANIZED HEALTH CARE EDUCATION/TRAINING PROGRAM | Admitting: EMERGENCY MEDICINE
Payer: COMMERCIAL

## 2024-10-15 VITALS
WEIGHT: 145.95 LBS | DIASTOLIC BLOOD PRESSURE: 84 MMHG | TEMPERATURE: 98 F | OXYGEN SATURATION: 98 % | RESPIRATION RATE: 18 BRPM | HEART RATE: 69 BPM | SYSTOLIC BLOOD PRESSURE: 152 MMHG

## 2024-10-15 LAB
ADD ON TEST-SPECIMEN IN LAB: SIGNIFICANT CHANGE UP
ALBUMIN SERPL ELPH-MCNC: 4.4 G/DL — SIGNIFICANT CHANGE UP (ref 3.3–5)
ALP SERPL-CCNC: 109 U/L — SIGNIFICANT CHANGE UP (ref 40–120)
ALT FLD-CCNC: 13 U/L — SIGNIFICANT CHANGE UP (ref 4–33)
ANION GAP SERPL CALC-SCNC: 11 MMOL/L — SIGNIFICANT CHANGE UP (ref 7–14)
AST SERPL-CCNC: 21 U/L — SIGNIFICANT CHANGE UP (ref 4–32)
BASOPHILS # BLD AUTO: 0.05 K/UL — SIGNIFICANT CHANGE UP (ref 0–0.2)
BASOPHILS NFR BLD AUTO: 1 % — SIGNIFICANT CHANGE UP (ref 0–2)
BILIRUB SERPL-MCNC: 0.5 MG/DL — SIGNIFICANT CHANGE UP (ref 0.2–1.2)
BUN SERPL-MCNC: 16 MG/DL — SIGNIFICANT CHANGE UP (ref 7–23)
CALCIUM SERPL-MCNC: 9.7 MG/DL — SIGNIFICANT CHANGE UP (ref 8.4–10.5)
CHLORIDE SERPL-SCNC: 102 MMOL/L — SIGNIFICANT CHANGE UP (ref 98–107)
CO2 SERPL-SCNC: 25 MMOL/L — SIGNIFICANT CHANGE UP (ref 22–31)
CREAT SERPL-MCNC: 1.03 MG/DL — SIGNIFICANT CHANGE UP (ref 0.5–1.3)
EGFR: 62 ML/MIN/1.73M2 — SIGNIFICANT CHANGE UP
EOSINOPHIL # BLD AUTO: 0.3 K/UL — SIGNIFICANT CHANGE UP (ref 0–0.5)
EOSINOPHIL NFR BLD AUTO: 5.9 % — SIGNIFICANT CHANGE UP (ref 0–6)
GLUCOSE SERPL-MCNC: 92 MG/DL — SIGNIFICANT CHANGE UP (ref 70–99)
HCT VFR BLD CALC: 35 % — SIGNIFICANT CHANGE UP (ref 34.5–45)
HGB BLD-MCNC: 12.5 G/DL — SIGNIFICANT CHANGE UP (ref 11.5–15.5)
IANC: 2.31 K/UL — SIGNIFICANT CHANGE UP (ref 1.8–7.4)
IMM GRANULOCYTES NFR BLD AUTO: 0 % — SIGNIFICANT CHANGE UP (ref 0–0.9)
LYMPHOCYTES # BLD AUTO: 2.01 K/UL — SIGNIFICANT CHANGE UP (ref 1–3.3)
LYMPHOCYTES # BLD AUTO: 39.8 % — SIGNIFICANT CHANGE UP (ref 13–44)
MCHC RBC-ENTMCNC: 28.7 PG — SIGNIFICANT CHANGE UP (ref 27–34)
MCHC RBC-ENTMCNC: 35.7 GM/DL — SIGNIFICANT CHANGE UP (ref 32–36)
MCV RBC AUTO: 80.3 FL — SIGNIFICANT CHANGE UP (ref 80–100)
MONOCYTES # BLD AUTO: 0.38 K/UL — SIGNIFICANT CHANGE UP (ref 0–0.9)
MONOCYTES NFR BLD AUTO: 7.5 % — SIGNIFICANT CHANGE UP (ref 2–14)
NEUTROPHILS # BLD AUTO: 2.31 K/UL — SIGNIFICANT CHANGE UP (ref 1.8–7.4)
NEUTROPHILS NFR BLD AUTO: 45.8 % — SIGNIFICANT CHANGE UP (ref 43–77)
NRBC # BLD: 0 /100 WBCS — SIGNIFICANT CHANGE UP (ref 0–0)
NRBC # FLD: 0 K/UL — SIGNIFICANT CHANGE UP (ref 0–0)
PLATELET # BLD AUTO: 324 K/UL — SIGNIFICANT CHANGE UP (ref 150–400)
POTASSIUM SERPL-MCNC: 4 MMOL/L — SIGNIFICANT CHANGE UP (ref 3.5–5.3)
POTASSIUM SERPL-SCNC: 4 MMOL/L — SIGNIFICANT CHANGE UP (ref 3.5–5.3)
PROT SERPL-MCNC: 7.4 G/DL — SIGNIFICANT CHANGE UP (ref 6–8.3)
RBC # BLD: 4.36 M/UL — SIGNIFICANT CHANGE UP (ref 3.8–5.2)
RBC # FLD: 12.6 % — SIGNIFICANT CHANGE UP (ref 10.3–14.5)
SODIUM SERPL-SCNC: 138 MMOL/L — SIGNIFICANT CHANGE UP (ref 135–145)
TROPONIN T, HIGH SENSITIVITY RESULT: 7 NG/L — SIGNIFICANT CHANGE UP
TROPONIN T, HIGH SENSITIVITY RESULT: 7 NG/L — SIGNIFICANT CHANGE UP
WBC # BLD: 5.05 K/UL — SIGNIFICANT CHANGE UP (ref 3.8–10.5)
WBC # FLD AUTO: 5.05 K/UL — SIGNIFICANT CHANGE UP (ref 3.8–10.5)

## 2024-10-15 PROCEDURE — 71046 X-RAY EXAM CHEST 2 VIEWS: CPT | Mod: 26

## 2024-10-15 PROCEDURE — 99223 1ST HOSP IP/OBS HIGH 75: CPT

## 2024-10-15 PROCEDURE — 93010 ELECTROCARDIOGRAM REPORT: CPT

## 2024-10-15 RX ORDER — ASPIRIN 325 MG
81 TABLET ORAL DAILY
Refills: 0 | Status: ACTIVE | OUTPATIENT
Start: 2024-10-15 | End: 2025-09-13

## 2024-10-15 RX ADMIN — Medication 81 MILLIGRAM(S): at 17:49

## 2024-10-15 NOTE — ED PROVIDER NOTE - ATTENDING APP SHARED VISIT CONTRIBUTION OF CARE
58yo F otherwise healthy pw intermittent burning pain under her left breast and her left upper back pain. usually related to specific movements or positions, started a week ago after moving some heavy furniture, not realted to exeriton, no cough, sob, fevers, no ho dvt/ no long car rides  pt well appearing, ekg unchanged from prior,  likely msk, low suspicion acs but will check serial trops and reassess

## 2024-10-15 NOTE — ED ADULT NURSE NOTE - OBJECTIVE STATEMENT
Pt awake and alert  x 4 with co pain under left breast x few day states pain today has decreased. Pt with no co sob . Pt with no co nausea or dizziness, iv palced. pt placed on cardiac monitor awaiting dsipo

## 2024-10-15 NOTE — CONSULT NOTE ADULT - SUBJECTIVE AND OBJECTIVE BOX
date of consult 10/15/24    HISTORY OF PRESENT ILLNESS: HPI:    59 year old female with no PMH presents to ER from the office where she presented reporting chest discomfort and back pain.  She reports lifting a heavy piece of furniture last week resulting in Left upper back pain, and then  began having tingling discomfort under her left breast beginning yesterday, both chest and back pain worse with movement of her left arm and twisting of her upper body.  She reports also having "indigestion and burping" which is not common for her.  She felt tired and dizzy after folding laundry and had trouble sleeping overnight so she came to the office this morning to be evaluated.      Last echo and NST at [facility] in 2022 were normal.      PAST MEDICAL & SURGICAL HISTORY:  No pertinent past medical history      No significant past surgical history      MEDICATIONS  (STANDING): NONE      Allergies  No Known Allergies      FAMILY HISTORY:  FH: HTN (hypertension)  Noncontributory for premature coronary disease or sudden cardiac death    SOCIAL HISTORY:    [x ] Non-smoker  [ ] Smoker  [ ] Alcohol    FLU VACCINE THIS YEAR STARTS IN AUGUST:  [ ] Yes    [ ] No    IF OVER 65 HAVE YOU EVER HAD A PNA VACCINE:  [ ] Yes    [ ] No       [ ] N/A      REVIEW OF SYSTEMS:  [ ]chest pain  [  ]shortness of breath  [  ]palpitations  [  ]syncope  [ ]near syncope [ ]upper extremity weakness   [ ] lower extremity weakness  [  ]diplopia  [  ]altered mental status   [  ]fevers  [ ]chills [ ]nausea  [ ]vomiting  [  ]dysphagia    [ ]abdominal pain  [ ]melena  [ ]BRBPR    [  ]epistaxis  [  ]rash    [ ]lower extremity edema        [ x] All others negative	  [ ] Unable to obtain      LABS:	 	    CARDIAC MARKERS:  Troponin T, High Sensitivity Result: 7                        12.5   5.05  )-----------( 324      ( 15 Oct 2024 15:50 )             35.0     138  |  102  |  16  ----------------------------<  92  4.0   |  25  |  1.03    Ca    9.7      15 Oct 2024 15:50    TPro  7.4  /  Alb  4.4  /  TBili  0.5  /  DBili  x   /  AST  21  /  ALT  13  /  AlkPhos  109  10-15    Creatinine Trend: 1.03<--    PHYSICAL EXAM:  T(C): 36.5 (10-15-24 @ 15:01), Max: 36.5 (10-15-24 @ 15:01)  HR: 69 (10-15-24 @ 15:01) (69 - 69)  BP: 152/84 (10-15-24 @ 15:01) (152/84 - 152/84)  RR: 18 (10-15-24 @ 15:01) (18 - 18)  SpO2: 98% (10-15-24 @ 15:01) (98% - 98%)    Gen: Appears well in NAD  HEENT:  (-)icterus (-)pallor  CV: N S1 S2 1/6 FREYA (+)2 Pulses B/l  Resp:  Clear to ausculatation B/L, normal effort  GI: (+) BS Soft, NT, ND  Lymph:  (-)Edema, (-)obvious lymphadenopathy  Skin: Warm to touch, Normal turgor  Psych: Appropriate mood and affect      ECG:  	NSR no changes compared to 2020    CXR pending    ASSESSMENT/PLAN: 59 year old female with no PMH presents to ER from the office where she presented reporting chest discomfort and back pain.     #Chest Pain  --atypical, probably MSK, however she reported severe pain this morning in the office radiating to the back  --check CXR  --Trend Trop T  --recommend CDU for TTE and NST  --telemetry monitoring    further reccs pending above  d/w ER     Natalie RAYMUNDO  [phone]

## 2024-10-15 NOTE — ED PROVIDER NOTE - PROGRESS NOTE DETAILS
NP Bereczky- labs unremarkable, trop 7, second trop pending, pt was seen by cardiac consult, recommendation staying in cdu for echo and stress test. Pt agrees with the plan.   Discussed case with cdu pa who accepted pt.

## 2024-10-15 NOTE — ED ADULT NURSE NOTE - NSFALLUNIVINTERV_ED_ALL_ED
Bed/Stretcher in lowest position, wheels locked, appropriate side rails in place/Call bell, personal items and telephone in reach/Instruct patient to call for assistance before getting out of bed/chair/stretcher/Non-slip footwear applied when patient is off stretcher/[place] to call system/Physically safe environment - no spills, clutter or unnecessary equipment/Purposeful proactive rounding/Room/bathroom lighting operational, light cord in reach

## 2024-10-15 NOTE — CONSULT NOTE ADULT - NS ATTEND AMEND GEN_ALL_CORE FT
Patient seen and examined. Agree with plan as detailed in PA/NP Note.     Check NST and NST    Annetta Gonzalez MD  Pager: [phone]

## 2024-10-15 NOTE — ED CDU PROVIDER INITIAL DAY NOTE - OBJECTIVE STATEMENT
59-year-old old female no pertinent past medical history (but endorses 2 years ago had chest discomfort had echo and stress test with negative results) with complaining of left chest discomfort which she radiates to the left scapula,  describes pain and discomfort like a burning sensation, with frequent burnings for the last 2 weeks worsening today. Reports pain started shortly after moving by herself a heavy furniture.  Denies  fever, chills, shortness of breath, headache, dizziness, lighthheadedness, loss of sensation, numbness, weakness, tingling, sensory deficits, aqbd pain, n, v.    CDU ZACKARY Barba: Agree with above.  59-year-old female with no reported past medical history sent in by her cardiology group due to sudden onset of severe left-sided chest pain that she started experiencing while at the cardiac clinic.  Patient's cardiologist Dr. Goodwin was not there, covering cardiologist advised patient to be seen in the ER.  Dr. Gonzalez saw patient while in the ER, troponin negative EKG with biphasic T waves however is unchanged from prior EKG and advises patient to stay for stress test and echocardiogram.

## 2024-10-15 NOTE — ED PROVIDER NOTE - OBJECTIVE STATEMENT
This is a 59-year-old old female no pertinent past medical history (but endorses 2 years ago had chest discomfort had echo and stress test with negative results) with complaining of left chest discomfort which she radiates to the left scapula,  describes pain and discomfort like a burning sensation, with frequent burnings for the last 2 weeks worsening today. Reports pain started shortly after moving by herself a heavy furniture.  Denies  fever, chills, shortness of breath, headache, dizziness, lighthheadedness, loss of sensation, numbness, weakness, tingling, sensory deficits, aqbd pain, n, v.

## 2024-10-15 NOTE — ED CDU PROVIDER INITIAL DAY NOTE - CLINICAL SUMMARY MEDICAL DECISION MAKING FREE TEXT BOX
59-year-old female with no reported past medical history sent in by her cardiology group due to sudden onset of severe left-sided chest pain that she started experiencing while at the cardiac clinic.  Patient's cardiologist Dr. Goodwin was not there, covering cardiologist advised patient to be seen in the ER.  Dr. Gonzalez saw patient while in the ER, troponin negative EKG with biphasic T waves however is unchanged from prior EKG and advises patient to stay for stress test and echocardiogram.

## 2024-10-15 NOTE — ED PROVIDER NOTE - CLINICAL SUMMARY MEDICAL DECISION MAKING FREE TEXT BOX
This is a 59-year-old old female no pertinent past medical history (but endorses 2 years ago had chest discomfort had echo and stress test with negative results) with complaining of left chest discomfort which she radiates to the left scapula,  describes pain and discomfort like a burning sensation, with frequent burnings for the last 2 weeks worsening today. Reports pain started shortly after moving by herself a heavy furniture.  Denies  fever, chills, shortness of breath, headache, dizziness, lighthheadedness, loss of sensation, numbness, weakness, tingling, sensory deficits, aqbd pain, n, v.   labs with trop and xray,   pt cardiology team seen pt while in er,

## 2024-10-16 ENCOUNTER — RESULT REVIEW (OUTPATIENT)
Age: 59
End: 2024-10-16

## 2024-10-16 VITALS
DIASTOLIC BLOOD PRESSURE: 69 MMHG | OXYGEN SATURATION: 100 % | RESPIRATION RATE: 18 BRPM | TEMPERATURE: 98 F | SYSTOLIC BLOOD PRESSURE: 118 MMHG | HEART RATE: 76 BPM

## 2024-10-16 PROCEDURE — 93306 TTE W/DOPPLER COMPLETE: CPT | Mod: 26

## 2024-10-16 PROCEDURE — 99239 HOSP IP/OBS DSCHRG MGMT >30: CPT

## 2024-10-16 PROCEDURE — 93018 CV STRESS TEST I&R ONLY: CPT | Mod: GC,MC

## 2024-10-16 PROCEDURE — 78451 HT MUSCLE IMAGE SPECT SING: CPT | Mod: 26,MC

## 2024-10-16 PROCEDURE — 93016 CV STRESS TEST SUPVJ ONLY: CPT | Mod: GC,MC

## 2024-10-16 RX ADMIN — Medication 81 MILLIGRAM(S): at 11:38

## 2024-10-16 NOTE — ED CDU PROVIDER SUBSEQUENT DAY NOTE - PHYSICAL EXAMINATION
CONSTITUTIONAL: Well-appearing; well-nourished; in no apparent distress. Non-toxic appearing.   NEURO: Alert & oriented. Sensory and motor functions are grossly intact.  PSYCH: Mood appropriate. Thought processes intact.   NECK: Supple.  CARD: Regular rate and rhythm, no murmurs  RESP: No accessory muscle use; breath sounds clear and equal bilaterally; no wheezes, rhonchi, or rales     ABD: Soft; non-distended; non-tender. No guarding or rebound.   MUSCULOSKELETAL/EXTREMITIES: FROM in all four extremities; no extremity edema.  SKIN: Warm; dry; no apparent lesions or exudate

## 2024-10-16 NOTE — ED CDU PROVIDER SUBSEQUENT DAY NOTE - HISTORY
59-year-old female with no reported past medical history sent in by her cardiology group due to sudden onset of severe left-sided chest pain that she started experiencing while at the cardiac clinic.  Patient's cardiologist Dr. Goodwin was not there, covering cardiologist advised patient to be seen in the ER.  Dr. Gonzalez saw patient while in the ER, troponin negative EKG with biphasic T waves however is unchanged from prior EKG and advises patient to stay for stress test and echocardiogram.    Interval hx- Pt without episode of CP since arriving to ED; appears very comfortable. VSS, no events on tele monitor thus far.

## 2024-10-16 NOTE — ED CDU PROVIDER DISPOSITION NOTE - CLINICAL COURSE
59-year-old female with no reported past medical history sent in by her cardiology group due to sudden onset of severe left-sided chest pain that she started experiencing while at the cardiac clinic.  Patient's cardiologist Dr. Goodwin was not there, covering cardiologist advised patient to be seen in the ER.  Dr. Gonzalez saw patient while in the ER, troponin negative EKG with biphasic T waves however is unchanged from prior EKG and advises patient to stay for stress test and echocardiogram.  Pt had both done which was benign.  Pt well appearing, vss will dc home.

## 2024-10-16 NOTE — ED CDU PROVIDER SUBSEQUENT DAY NOTE - ATTENDING APP SHARED VISIT CONTRIBUTION OF CARE
I have made the decision to admit this patient to the CDU as documented in my Provider note I have reviewed the note  written by the CDU Physician Assistant, on that visit day. I have supervised and participated as necessary in the performance of procedures indicated for patient management and was available at all phases of the patient´s visit when needed.    Please see the  provider note for the details of the decision to admit  Vital Signs Last 24 Hrs  T(C): 36.6 (16 Oct 2024 05:50), Max: 36.9 (15 Oct 2024 21:30)  T(F): 97.9 (16 Oct 2024 05:50), Max: 98.4 (15 Oct 2024 21:30)  HR: 69 (16 Oct 2024 05:50) (65 - 73)  BP: 130/81 (16 Oct 2024 05:50) (130/81 - 156/91)  BP(mean): --  RR: 18 (16 Oct 2024 05:50) (18 - 18)  SpO2: 99% (16 Oct 2024 05:50) (95% - 100%)    Parameters below as of 16 Oct 2024 05:50  Patient On (Oxygen Delivery Method): room air      PE unchanged at time of admission  v1-v3 asymptomatic awaiting ECHO ST while being monitored

## 2024-10-16 NOTE — PROGRESS NOTE ADULT - SUBJECTIVE AND OBJECTIVE BOX
Date of service 10/16/24    no chest pain, SOB or Palps, ROS otherwise negative    MEDS:  aspirin  chewable 81 milliGRAM(s) Oral daily                            12.5   5.05  )-----------( 324      ( 15 Oct 2024 15:50 )             35.0       10-15    138  |  102  |  16  ----------------------------<  92  4.0   |  25  |  1.03    Ca    9.7      15 Oct 2024 15:50    TPro  7.4  /  Alb  4.4  /  TBili  0.5  /  DBili  x   /  AST  21  /  ALT  13  /  AlkPhos  109  10-15      T(C): 36.7 (10-16-24 @ 09:32), Max: 36.9 (10-15-24 @ 21:30)  HR: 71 (10-16-24 @ 09:32) (65 - 73)  BP: 116/66 (10-16-24 @ 09:32) (116/66 - 156/91)  RR: 18 (10-16-24 @ 09:32) (18 - 18)  SpO2: 98% (10-16-24 @ 09:32) (95% - 100%)  Wt(kg): --    I&O's Summary      General: Well nourished in no acute distress. Alert and Oriented * 3.   Head: Normocephalic and atraumatic.   Neck: No JVD. No bruits. Supple. Does not appear to be enlarged.   Cardiovascular: + S1,S2 ; RRR Soft systolic murmur at the left lower sternal border. No rubs noted.    Lungs: CTA b/l. No rhonchi, rales or wheezes.   Abdomen: + BS, soft. Non tender. Non distended. No rebound. No guarding.   Extremities: No clubbing/cyanosis/edema.   Neurologic: Moves all four extremities. Full range of motion.   Skin: Warm and moist. The patient's skin has normal elasticity and good skin turgor.   Psychiatric: Appropriate mood and affect.  Musculoskeletal: Normal range of motion, normal strength    DATA    < from: Xray Chest 2 Views PA/Lat (10.15.24 @ 16:46) >  FINDINGS:  Examination in frontal and lateral projection fails to show   evidence of any active pulmonary disease.  The heart is not enlarged and   there is no pleural effusion or pneumothorax.    There is no acute bone pathology.    COMPARISON: No prior exams available.    IMPRESSION: Clear lungs.    < end of copied text >      < from: TTE W or WO Ultrasound Enhancing Agent (10.16.24 @ 06:30) >     CONCLUSIONS:      1. Left ventricular systolic function is normal with an ejection fraction visually estimated at 55 to 60 %.   2. Left atrium is normal in size.   3. The right atrium is dilated.   4. Normal right ventricular cavity size and normal right ventricular systolic function.   5. No significant valvular disease.   6. Tricuspid aortic valve with normal leaflet excursion with normal systolic excursion.   7. No pericardial effusion seen.    < end of copied text >      ASSESSMENT/PLAN: 59 year old female with no PMH presents to ER from the office where she presented reporting chest discomfort and back pain.     #Chest Pain  --atypical, probably MSK, however she reported severe pain this morning in the office radiating to the back  --CXR normal per report  --ACS ruled out  --recommend CDU for TTE and NST  --telemetry monitoring  --TTE with structurally normal heart  --NST pending    F/U with Dr Paige in the office 10/30 at 9AM, [phone]      Natalie RAYMUNDO  [phone]

## 2024-10-16 NOTE — PROGRESS NOTE ADULT - NS ATTEND AMEND GEN_ALL_CORE FT
Patient seen and examined. Agree with plan as detailed in PA/NP Note.     TTE normal LVEF and no rwma    f/u nst    Annetta Gonzalez MD  Pager: [phone]

## 2024-10-16 NOTE — ED CDU PROVIDER DISPOSITION NOTE - NSFOLLOWUPINSTRUCTIONS_ED_ALL_ED_FT
Drink plenty of fluids. Rest; avoid any strenuous activity. Follow up with your PMD in 2 days-bring all the copies of test results and discharge instructions given to you.  Follow up with your cardiologist as scheduled on 10/30.  Return to ED for any worsening chest pain, shortness of breath, weakness or any other concerning symptoms.

## 2024-10-16 NOTE — ED CDU PROVIDER DISPOSITION NOTE - PATIENT PORTAL LINK FT
You can access the FollowMyHealth Patient Portal offered by [facility] by registering at the following website: [email or web address]. By joining [employer]’s FollowMyHealth portal, you will also be able to view your health information using other applications (apps) compatible with our system.

## 2024-10-16 NOTE — ED CDU PROVIDER DISPOSITION NOTE - ATTENDING CONTRIBUTION TO CARE
I have made the decision to discharge this patient to the CDU as documented in my Provider note I have reviewed the note  written by the CDU Physician Assistant, on that visit day. I have supervised and participated as necessary in the performance of procedures indicated for patient management and was available at all phases of the patient´s visit when needed.    Patient cleared by cardiology for d/c echo normal   d/c with followup   RTED PRN

## 2024-10-16 NOTE — ED ADULT NURSE REASSESSMENT NOTE - NS ED NURSE REASSESS COMMENT FT1
Patient resting in bed, at baseline mental status, no acute distress noted at this time. Respirations equal and unlabored. Pt on cardiac monitor, NSR noted. Care plan continued. Comfort measures provided. Safety maintained. Awaiting results.
Report given to CDU ISAC Frias; patient a&ox3, awake and resting in stretcher; respirations even and unlabored, no signs/symptoms of acute distress. Steady gait observed, patient transported to CDU bed 2 in stable condition.
Pt denies any pain, SOB, dizziness, palpitations, sinus rhythm on tele, plan is ECHO and stress test, safety maintained, call bell with in reach, will continue to monitor

## 2025-04-04 ENCOUNTER — LABORATORY RESULT (OUTPATIENT)
Age: 60
End: 2025-04-04

## 2025-04-04 ENCOUNTER — APPOINTMENT (OUTPATIENT)
Dept: FAMILY MEDICINE | Facility: CLINIC | Age: 60
End: 2025-04-04
Payer: COMMERCIAL

## 2025-04-04 VITALS
WEIGHT: 147 LBS | BODY MASS INDEX: 27.05 KG/M2 | HEIGHT: 62 IN | RESPIRATION RATE: 16 BRPM | HEART RATE: 64 BPM | OXYGEN SATURATION: 97 %

## 2025-04-04 VITALS — DIASTOLIC BLOOD PRESSURE: 85 MMHG | SYSTOLIC BLOOD PRESSURE: 125 MMHG

## 2025-04-04 DIAGNOSIS — Z78.9 OTHER SPECIFIED HEALTH STATUS: ICD-10-CM

## 2025-04-04 DIAGNOSIS — Z82.49 FAMILY HISTORY OF ISCHEMIC HEART DISEASE AND OTHER DISEASES OF THE CIRCULATORY SYSTEM: ICD-10-CM

## 2025-04-04 DIAGNOSIS — R79.89 OTHER SPECIFIED ABNORMAL FINDINGS OF BLOOD CHEMISTRY: ICD-10-CM

## 2025-04-04 DIAGNOSIS — Z83.3 FAMILY HISTORY OF DIABETES MELLITUS: ICD-10-CM

## 2025-04-04 DIAGNOSIS — Z13.1 ENCOUNTER FOR SCREENING FOR DIABETES MELLITUS: ICD-10-CM

## 2025-04-04 DIAGNOSIS — Z00.00 ENCOUNTER FOR GENERAL ADULT MEDICAL EXAMINATION W/OUT ABNORMAL FINDINGS: ICD-10-CM

## 2025-04-04 PROCEDURE — 99396 PREV VISIT EST AGE 40-64: CPT

## 2025-04-05 PROBLEM — Z13.1 SCREENING FOR DIABETES MELLITUS: Status: ACTIVE | Noted: 2025-04-05

## 2025-04-06 PROBLEM — R82.90 ABNORMAL URINALYSIS: Status: ACTIVE | Noted: 2025-04-06

## 2025-04-06 LAB
ALBUMIN SERPL ELPH-MCNC: 4.3 G/DL
ALP BLD-CCNC: 114 U/L
ALT SERPL-CCNC: 16 U/L
ANION GAP SERPL CALC-SCNC: 10 MMOL/L
APPEARANCE: CLEAR
AST SERPL-CCNC: 18 U/L
BASOPHILS # BLD AUTO: 0.04 K/UL
BASOPHILS NFR BLD AUTO: 1 %
BILIRUB SERPL-MCNC: 0.6 MG/DL
BILIRUBIN URINE: NEGATIVE
BLOOD URINE: ABNORMAL
BUN SERPL-MCNC: 17 MG/DL
CALCIUM SERPL-MCNC: 9.3 MG/DL
CHLORIDE SERPL-SCNC: 104 MMOL/L
CHOLEST SERPL-MCNC: 257 MG/DL
CO2 SERPL-SCNC: 25 MMOL/L
COLOR: YELLOW
CREAT SERPL-MCNC: 1.06 MG/DL
EGFRCR SERPLBLD CKD-EPI 2021: 60 ML/MIN/1.73M2
EOSINOPHIL # BLD AUTO: 0.22 K/UL
EOSINOPHIL NFR BLD AUTO: 5.3 %
GLUCOSE QUALITATIVE U: NEGATIVE MG/DL
GLUCOSE SERPL-MCNC: 99 MG/DL
HCT VFR BLD CALC: 35 %
HDLC SERPL-MCNC: 112 MG/DL
HGB BLD-MCNC: 11.9 G/DL
IMM GRANULOCYTES NFR BLD AUTO: 0.2 %
KETONES URINE: NEGATIVE MG/DL
LDLC SERPL-MCNC: 137 MG/DL
LEUKOCYTE ESTERASE URINE: NEGATIVE
LYMPHOCYTES # BLD AUTO: 1.59 K/UL
LYMPHOCYTES NFR BLD AUTO: 37.9 %
MAN DIFF?: NORMAL
MCHC RBC-ENTMCNC: 27.7 PG
MCHC RBC-ENTMCNC: 34 G/DL
MCV RBC AUTO: 81.4 FL
MONOCYTES # BLD AUTO: 0.34 K/UL
MONOCYTES NFR BLD AUTO: 8.1 %
NEUTROPHILS # BLD AUTO: 1.99 K/UL
NEUTROPHILS NFR BLD AUTO: 47.5 %
NITRITE URINE: NEGATIVE
NONHDLC SERPL-MCNC: 144 MG/DL
PH URINE: 6
PLATELET # BLD AUTO: 302 K/UL
POTASSIUM SERPL-SCNC: 4.6 MMOL/L
PROT SERPL-MCNC: 6.9 G/DL
PROTEIN URINE: NEGATIVE MG/DL
RBC # BLD: 4.3 M/UL
RBC # FLD: 13.9 %
SODIUM SERPL-SCNC: 139 MMOL/L
SPECIFIC GRAVITY URINE: 1.02
TRIGL SERPL-MCNC: 47 MG/DL
TSH SERPL-ACNC: 1.73 UIU/ML
UROBILINOGEN URINE: 0.2 MG/DL
WBC # FLD AUTO: 4.19 K/UL

## 2025-04-08 LAB
ESTIMATED AVERAGE GLUCOSE: 120 MG/DL
HBA1C MFR BLD HPLC: 5.8 %

## 2025-04-11 DIAGNOSIS — E78.5 HYPERLIPIDEMIA, UNSPECIFIED: ICD-10-CM

## 2025-04-11 RX ORDER — ATORVASTATIN CALCIUM 10 MG/1
10 TABLET, FILM COATED ORAL
Qty: 90 | Refills: 1 | Status: ACTIVE | COMMUNITY
Start: 2025-04-11 | End: 1900-01-01

## 2025-06-03 NOTE — PATIENT PROFILE ADULT - NSPROGENSOURCEINFO_GEN_A_NUR
Is This A New Presentation, Or A Follow-Up?: Growth
How Severe Is Your Skin Lesion?: moderate
Has Your Skin Lesion Been Treated?: not been treated
patient